# Patient Record
Sex: FEMALE | Race: WHITE
[De-identification: names, ages, dates, MRNs, and addresses within clinical notes are randomized per-mention and may not be internally consistent; named-entity substitution may affect disease eponyms.]

---

## 2019-11-25 ENCOUNTER — HOSPITAL ENCOUNTER (OUTPATIENT)
Dept: HOSPITAL 92 - SCSMRI | Age: 64
Discharge: HOME | End: 2019-11-25
Attending: ORTHOPAEDIC SURGERY
Payer: COMMERCIAL

## 2019-11-25 DIAGNOSIS — M75.111: ICD-10-CM

## 2019-11-25 DIAGNOSIS — M48.02: ICD-10-CM

## 2019-11-25 DIAGNOSIS — S46.811A: ICD-10-CM

## 2019-11-25 DIAGNOSIS — M54.12: ICD-10-CM

## 2019-11-25 DIAGNOSIS — M75.41: Primary | ICD-10-CM

## 2019-11-25 PROCEDURE — 72141 MRI NECK SPINE W/O DYE: CPT

## 2019-11-25 NOTE — MRI
RIGHT SHOULDER MRI WITHOUT IV CONTRAST:

 

Date:  11/25/19 

 

HISTORY:  

Right shoulder impingement syndrome, pain. 

 

FINDINGS:

Multiplanar, multisequence MRI examination of the right shoulder is performed. There are some AC join
t arthrosis changes with minimal fluid in the subacromial/subdeltoid burs. There is a mid-grade under
surface partial thickness tear of the infraspinatus at the insertion. There is also an undersurface a
nd minimally delaminating tear of the infraspinatus extending to the myotendinous junction. Subscapul
sean tendons and biceps tendons appear intact. Rotator cuff muscles are within normal limits of signa
l and volume. There is slight blunting of the posterior superior labrum. No acute osteochondral defec
t. 

 

IMPRESSION: 

Partial thickness tears of the supraspinatus and infraspinatus tendons without complete full thicknes
s or retracted tear. Blunting of the posterior superior labrum, probably degenerative. 

 

 

POS: [facility]

## 2019-11-25 NOTE — MRI
MRI CERVICAL SPINE WITHOUT CONTRAST ENHANCEMENT:

 

HISTORY:

Cervical radiculopathy. Right shoulder pain.

 

FINDINGS:

The vertebral bodies are normal in height. Disk space height appears fairly well maintained. Cord sig
nal change is normal.

 

C2-C3:  Unremarkable.

 

C3-C4:  Unremarkable.

 

C4-C5:  Unremarkable.

 

C5-C6: There is disk bulge at this level. There are some mild facet and uncovertebral changes and the
re is a mild to moderate degree of right-sided foraminal narrowing.

 

C6-C7: No significant canal or foraminal stenosis at this level.

 

C7-T1: Unremarkable.

 

IMPRESSION:

1. Mild right-sided foraminal narrowing at the C5-C6 level.

 

2. Slightly asymmetric right uncovertebral changes are noted.

 

POS: [facility]

## 2024-12-27 ENCOUNTER — HOSPITAL ENCOUNTER (OUTPATIENT)
Dept: HOSPITAL 92 - CSHRAD | Age: 69
Discharge: HOME | End: 2024-12-27
Attending: FAMILY MEDICINE
Payer: COMMERCIAL

## 2024-12-27 DIAGNOSIS — J10.1: Primary | ICD-10-CM

## 2024-12-27 PROCEDURE — 71046 X-RAY EXAM CHEST 2 VIEWS: CPT
